# Patient Record
Sex: FEMALE | Race: BLACK OR AFRICAN AMERICAN | NOT HISPANIC OR LATINO | Employment: PART TIME | URBAN - METROPOLITAN AREA
[De-identification: names, ages, dates, MRNs, and addresses within clinical notes are randomized per-mention and may not be internally consistent; named-entity substitution may affect disease eponyms.]

---

## 2018-12-11 ENCOUNTER — OFFICE VISIT (OUTPATIENT)
Dept: OBSTETRICS AND GYNECOLOGY | Facility: CLINIC | Age: 18
End: 2018-12-11

## 2018-12-11 VITALS — DIASTOLIC BLOOD PRESSURE: 78 MMHG | SYSTOLIC BLOOD PRESSURE: 122 MMHG | WEIGHT: 200 LBS

## 2018-12-11 DIAGNOSIS — N63.10 BREAST MASS, RIGHT: Primary | ICD-10-CM

## 2018-12-11 DIAGNOSIS — N89.8 VAGINAL IRRITATION: ICD-10-CM

## 2018-12-11 DIAGNOSIS — N64.4 BREAST PAIN IN FEMALE: ICD-10-CM

## 2018-12-11 PROCEDURE — 87210 SMEAR WET MOUNT SALINE/INK: CPT | Performed by: OBSTETRICS & GYNECOLOGY

## 2018-12-11 PROCEDURE — 99204 OFFICE O/P NEW MOD 45 MIN: CPT | Performed by: OBSTETRICS & GYNECOLOGY

## 2018-12-11 NOTE — PROGRESS NOTES
"Subjective   Chief Complaint   Patient presents with   • Gynecologic Exam     C/O's vaginal itching and cottage chesse like discharge. Lumps in both breasts.     Jaziel Agudelo is a 18 y.o. year old .  Patient's last menstrual period was 2018 (exact date).  She presents to be seen because of lumps in her breasts she noticed about one month. Has had a lump in left breast in the past but was previously told it was okay but it has increased in size. She has pain on both sides unrelated to menses. She denies using any caffeine. She also reports vaginal itching and discharge for one year. Reports off and on vaginal odor. No vaginal itching currently. Reports currently having \"cottage cheese\" like discharge. Has tried garlic at home which she reports cleared it up but came back the next week.     LMP 12/10/18    OTHER THINGS SHE WANTS TO DISCUSS TODAY:  Nothing else    The following portions of the patient's history were reviewed and updated as appropriate:current medications and allergies    Social History    Tobacco Use      Smoking status: Never Smoker    Review of Systems  Constitutional POS: nothing reported    NEG: anorexia or night sweats   Genitourinary POS: nothing reported    NEG: dysuria or hematuria   Gastointestinal POS: nothing reported    NEG: bloating, change in bowel habits, melena or reflux symptoms   Integument POS: nothing reported    NEG: moles that are changing in size, shape, color or rashes   Breast POS: bilateral breast pain and right breast lump    NEG: skin dimpling or nipple discharge         Objective   /78 (Patient Position: Sitting)   Wt 90.7 kg (200 lb)   LMP 2018 (Exact Date)     General:  well developed; well nourished  no acute distress   Skin:  No suspicious lesions seen   Thyroid: not examined   Lungs:  breathing is unlabored   Heart:  Not performed.   Breasts:  Examined in supine position  Nipples normal without inversion, lesions or discharge  There are no " palpable axillary nodes  There is an ~  2 cm mass is present in the right breast at 2 o'clock.  It is 2 cm from the outer edge of the areola.  It is freely mobile.  Fibrocystic changes   Abdomen: soft, non-tender; no masses  no umbilical or inguinal hernias are present  no hepato-splenomegaly   Pelvis: Clinical staff was present for exam  External genitalia:  normal appearance of the external genitalia including Bartholin's and [place]'s glands.  :  urethral meatus normal;  Vaginal:  normal pink mucosa without prolapse or lesions. blood present -  small amount;  Cervix:  normal appearance. blood is seen coming from external cervical os;  Uterus:  normal size, shape and consistency. anteverted;  Adnexa:  normal bimanual exam of the adnexa.  Rectal:  digital rectal exam not performed; anus visually normal appearing.   KOH - no pseudohyphae sent      Lab Review   No data reviewed    Imaging   No data reviewed        Assessment   1. Bilateral breast pain  2. Vaginal irritation, discharge     Plan   1. KOH normal. Vaginitis and leukorrhea panel. Reviewed proper perineal hygiene. The use of condoms for STD prevention was emphasized.  It was explained to Jaziel that condoms are not a full proof way to eliminate the chance of a sexually transmitted disease.  Ultimately knowing her partner's sexual history is most important.  All of her questions related to condom use were answered.   2. The following data needs to be obtained to update her medical records: breast ultrasound reports from ~2 years ago.  3. The importance of keeping all planned follow-up and taking all medications as prescribed was emphasized.  4. Follow up for recheck of breast pain in 6 weeks    No orders of the defined types were placed in this encounter.      Total time spent today with Jaziel  was 50 minutes (level 4).  Of this time, > 50% was spent face-to-face time coordinating care, answering her questions and counseling regarding pathophysiology of her  presenting problem along with plans for any diagnositc work-up and treatment.      This note was electronically signed.    Laura Young MD  December 11, 2018    Note: Speech recognition transcription software may have been used to create portions of this document.  An attempt at proofreading has been made but errors in transcription could still be present.

## 2018-12-13 ENCOUNTER — HOSPITAL ENCOUNTER (OUTPATIENT)
Dept: ULTRASOUND IMAGING | Facility: HOSPITAL | Age: 18
Discharge: HOME | End: 2018-12-13
Attending: OBSTETRICS & GYNECOLOGY | Admitting: OBSTETRICS & GYNECOLOGY

## 2018-12-13 ENCOUNTER — APPOINTMENT (OUTPATIENT)
Dept: OTHER | Facility: HOSPITAL | Age: 18
End: 2018-12-13
Attending: OBSTETRICS & GYNECOLOGY

## 2018-12-13 DIAGNOSIS — N63.0 LUMP IN FEMALE BREAST: ICD-10-CM

## 2018-12-13 DIAGNOSIS — N63.10 BREAST MASS, RIGHT: ICD-10-CM

## 2018-12-13 PROCEDURE — 76642 ULTRASOUND BREAST LIMITED: CPT

## 2018-12-13 PROCEDURE — 76642 ULTRASOUND BREAST LIMITED: CPT | Performed by: RADIOLOGY

## 2018-12-17 ENCOUNTER — TELEPHONE (OUTPATIENT)
Dept: OBSTETRICS AND GYNECOLOGY | Facility: CLINIC | Age: 18
End: 2018-12-17

## 2018-12-17 RX ORDER — FLUCONAZOLE 150 MG/1
150 TABLET ORAL ONCE
Qty: 1 TABLET | Refills: 1 | Status: SHIPPED | OUTPATIENT
Start: 2018-12-17 | End: 2018-12-17

## 2018-12-17 RX ORDER — METRONIDAZOLE 500 MG/1
500 TABLET ORAL 2 TIMES DAILY
Qty: 14 TABLET | Refills: 0 | Status: SHIPPED | OUTPATIENT
Start: 2018-12-17 | End: 2018-12-17 | Stop reason: ALTCHOICE

## 2018-12-17 RX ORDER — METRONIDAZOLE 7.5 MG/G
GEL VAGINAL DAILY
Qty: 70 G | Refills: 1 | Status: SHIPPED | OUTPATIENT
Start: 2018-12-17 | End: 2018-12-22

## 2018-12-17 NOTE — TELEPHONE ENCOUNTER
Spoke with pt to advise her of culture results and prescriptions sent to her pharmacy. Pt has concerns about medications having made her nauseous with prior treatment.

## 2018-12-17 NOTE — TELEPHONE ENCOUNTER
STD cultures negative but positive for yeast and bacterial vaginosis. Can patient be informed and that I send flagyl to her pharmacy to take twice a day for one week and diflucan (one pill and if no improvement in symptoms in 3 days then repeat dose)? Also, no alcohol use while taking flagyl. Thanks  Laura Young MD

## 2018-12-18 ENCOUNTER — TELEPHONE (OUTPATIENT)
Dept: OBSTETRICS AND GYNECOLOGY | Facility: CLINIC | Age: 18
End: 2018-12-18

## 2018-12-18 NOTE — TELEPHONE ENCOUNTER
Dr Young Pt    Pt states that the Metronidazole prescribe for her  Yesterday is too expensive.  She would like a Rx for something similar but more affordable.    Callback [phone]    [facility] [place]

## 2018-12-18 NOTE — TELEPHONE ENCOUNTER
The metrogel is what she asked for. The oral medication may be cheaper? These are the two first line medications for bacterial vaginosis.     Laura Young MD

## 2018-12-18 NOTE — TELEPHONE ENCOUNTER
Pt calling back since at pharmacy and still waiting on script. Pt also has question regarding taking the medication with the gel.

## 2018-12-19 RX ORDER — METRONIDAZOLE 500 MG/1
500 TABLET ORAL 2 TIMES DAILY
Qty: 14 TABLET | Refills: 0 | Status: SHIPPED | OUTPATIENT
Start: 2018-12-19 | End: 2018-12-26

## 2018-12-19 NOTE — TELEPHONE ENCOUNTER
Dr Young Patient    Returning call to nurse, please call her    Call Back Number (for this call only) [phone]

## 2018-12-26 ENCOUNTER — TELEPHONE (OUTPATIENT)
Dept: OBSTETRICS AND GYNECOLOGY | Facility: CLINIC | Age: 18
End: 2018-12-26

## 2018-12-26 NOTE — TELEPHONE ENCOUNTER
Pt called back and let know that script for flagyl called in on 12/19/18 to check with pharmacy and if not   Correct to let us know.

## 2019-01-22 ENCOUNTER — OFFICE VISIT (OUTPATIENT)
Dept: OBSTETRICS AND GYNECOLOGY | Facility: CLINIC | Age: 19
End: 2019-01-22

## 2019-01-22 DIAGNOSIS — N89.8 VAGINAL IRRITATION: Primary | ICD-10-CM

## 2019-01-22 DIAGNOSIS — N64.4 BREAST PAIN IN FEMALE: ICD-10-CM

## 2019-01-22 PROCEDURE — 99213 OFFICE O/P EST LOW 20 MIN: CPT | Performed by: OBSTETRICS & GYNECOLOGY

## 2019-01-22 NOTE — PROGRESS NOTES
Subjective   Chief Complaint   Patient presents with   • Gynecologic Exam     F/U breast mass     Jaziel Agudelo is a 18 y.o. year old .  Patient's last menstrual period was 2018 (approximate).  She presents to be seen because of follow up of vaginal irritation and bilateral breast pain.     Was diagnosed with bacterial vaginosis and vaginal yeast infection at her last visit with vaginal swabs.  She reports she has not taken her MetroGel or Diflucan.  She wanted to wait until after her period.  She reports she still has the same symptoms present 1 I saw her in December.  I reviewed ultrasound of right breast was overall normal concerning the small right mobile breast mass that I felt that she had previously reported to another physician.  She reports that breast pain has improved since I last saw her.  She denies the breast lump growing in size or shape.    OTHER THINGS SHE WANTS TO DISCUSS TODAY:  Nothing else    The following portions of the patient's history were reviewed and updated as appropriate:current medications and allergies    Social History    Tobacco Use      Smoking status: Never Smoker    Review of Systems  Constitutional POS: nothing reported    NEG: anorexia or night sweats   Genitourinary POS: nothing reported    NEG: dysuria or hematuria   Gastointestinal POS: nothing reported    NEG: bloating, change in bowel habits, melena or reflux symptoms   Integument POS: nothing reported    NEG: moles that are changing in size, shape, color or rashes   Breast POS: breast pain improved    NEG: skin dimpling or nipple discharge         Objective   LMP 2018 (Approximate)     General:  well developed; well nourished  no acute distress   Skin:  Not performed.   Thyroid: not examined   Lungs:  breathing is unlabored   Heart:  Not performed.   Breasts:  Not performed.   Abdomen: Not performed.   Pelvis: Not performed.     Lab Review   No data reviewed    Imaging   Breast ultrasound report         Assessment   1. Vaginal irritation   2. Bilateral breast pain  3. Right breast lump consistent with fibrocystic change     Plan   1. Recommended that she complete MetroGel and Diflucan.  2. Sutured patient regarding breast exam last month and ultrasound.  She was instructed to let me know if pain returns or if breast lump changes in size or shape  3. The importance of keeping all planned follow-up and taking all medications as prescribed was emphasized.  4. Follow up for annual exam in one year    No orders of the defined types were placed in this encounter.         This note was electronically signed.    Laura Young MD  January 22, 2019    Note: Speech recognition transcription software may have been used to create portions of this document.  An attempt at proofreading has been made but errors in transcription could still be present.

## 2020-01-19 PROBLEM — Z01.419 WELL WOMAN EXAM: Status: ACTIVE | Noted: 2020-01-19

## 2020-01-24 ENCOUNTER — TELEPHONE (OUTPATIENT)
Dept: OBSTETRICS AND GYNECOLOGY | Facility: CLINIC | Age: 20
End: 2020-01-24